# Patient Record
Sex: FEMALE | Race: WHITE
[De-identification: names, ages, dates, MRNs, and addresses within clinical notes are randomized per-mention and may not be internally consistent; named-entity substitution may affect disease eponyms.]

---

## 2018-05-07 ENCOUNTER — HOSPITAL ENCOUNTER (EMERGENCY)
Dept: HOSPITAL 8 - ED | Age: 28
Discharge: HOME | End: 2018-05-07
Payer: COMMERCIAL

## 2018-05-07 VITALS — SYSTOLIC BLOOD PRESSURE: 115 MMHG | DIASTOLIC BLOOD PRESSURE: 75 MMHG

## 2018-05-07 VITALS — HEIGHT: 71 IN | WEIGHT: 213.85 LBS | BODY MASS INDEX: 29.94 KG/M2

## 2018-05-07 DIAGNOSIS — O21.1: Primary | ICD-10-CM

## 2018-05-07 DIAGNOSIS — E86.0: ICD-10-CM

## 2018-05-07 DIAGNOSIS — O23.12: ICD-10-CM

## 2018-05-07 DIAGNOSIS — O99.282: ICD-10-CM

## 2018-05-07 DIAGNOSIS — Z3A.13: ICD-10-CM

## 2018-05-07 LAB
ALBUMIN SERPL-MCNC: 3 G/DL (ref 3.4–5)
ALP SERPL-CCNC: 63 U/L (ref 45–117)
ALT SERPL-CCNC: 21 U/L (ref 12–78)
ANION GAP SERPL CALC-SCNC: 8 MMOL/L (ref 5–15)
BASOPHILS # BLD AUTO: 0.02 X10^3/UL (ref 0–0.1)
BASOPHILS NFR BLD AUTO: 1 % (ref 0–1)
BILIRUB SERPL-MCNC: 1 MG/DL (ref 0.2–1)
CALCIUM SERPL-MCNC: 8.4 MG/DL (ref 8.5–10.1)
CHLORIDE SERPL-SCNC: 109 MMOL/L (ref 98–107)
CREAT SERPL-MCNC: 0.64 MG/DL (ref 0.55–1.02)
CULTURE INDICATED?: YES
EOSINOPHIL # BLD AUTO: 0.03 X10^3/UL (ref 0–0.4)
EOSINOPHIL NFR BLD AUTO: 1 % (ref 1–7)
ERYTHROCYTE [DISTWIDTH] IN BLOOD BY AUTOMATED COUNT: 13.7 % (ref 9.6–15.2)
LYMPHOCYTES # BLD AUTO: 0.43 X10^3/UL (ref 1–3.4)
LYMPHOCYTES NFR BLD AUTO: 11 % (ref 22–44)
MCH RBC QN AUTO: 28.7 PG (ref 27–34.8)
MCHC RBC AUTO-ENTMCNC: 33.8 G/DL (ref 32.4–35.8)
MCV RBC AUTO: 84.8 FL (ref 80–100)
MD: NO
MICROSCOPIC: (no result)
MONOCYTES # BLD AUTO: 0.36 X10^3/UL (ref 0.2–0.8)
MONOCYTES NFR BLD AUTO: 9 % (ref 2–9)
NEUTROPHILS # BLD AUTO: 3.21 X10^3/UL (ref 1.8–6.8)
NEUTROPHILS NFR BLD AUTO: 79 % (ref 42–75)
PLATELET # BLD AUTO: 194 X10^3/UL (ref 130–400)
PMV BLD AUTO: 9.2 FL (ref 7.4–10.4)
PROT SERPL-MCNC: 7.1 G/DL (ref 6.4–8.2)
RBC # BLD AUTO: 4.11 X10^6/UL (ref 3.82–5.3)

## 2018-05-07 PROCEDURE — S0028 INJECTION, FAMOTIDINE, 20 MG: HCPCS

## 2018-05-07 PROCEDURE — 96374 THER/PROPH/DIAG INJ IV PUSH: CPT

## 2018-05-07 PROCEDURE — 80053 COMPREHEN METABOLIC PANEL: CPT

## 2018-05-07 PROCEDURE — 87086 URINE CULTURE/COLONY COUNT: CPT

## 2018-05-07 PROCEDURE — 85025 COMPLETE CBC W/AUTO DIFF WBC: CPT

## 2018-05-07 PROCEDURE — 96375 TX/PRO/DX INJ NEW DRUG ADDON: CPT

## 2018-05-07 PROCEDURE — 36415 COLL VENOUS BLD VENIPUNCTURE: CPT

## 2018-05-07 PROCEDURE — 96361 HYDRATE IV INFUSION ADD-ON: CPT

## 2018-05-07 PROCEDURE — 81001 URINALYSIS AUTO W/SCOPE: CPT

## 2018-05-07 PROCEDURE — 99285 EMERGENCY DEPT VISIT HI MDM: CPT

## 2018-06-11 ENCOUNTER — HOSPITAL ENCOUNTER (EMERGENCY)
Dept: HOSPITAL 8 - ED | Age: 28
Discharge: HOME | End: 2018-06-11
Payer: COMMERCIAL

## 2018-06-11 VITALS — SYSTOLIC BLOOD PRESSURE: 106 MMHG | DIASTOLIC BLOOD PRESSURE: 64 MMHG

## 2018-06-11 VITALS — BODY MASS INDEX: 30.96 KG/M2 | WEIGHT: 221.12 LBS | HEIGHT: 71 IN

## 2018-06-11 DIAGNOSIS — O23.12: Primary | ICD-10-CM

## 2018-06-11 DIAGNOSIS — Z3A.18: ICD-10-CM

## 2018-06-11 LAB
BASOPHILS # BLD AUTO: 0.1 X10^3/UL (ref 0–0.1)
BASOPHILS NFR BLD AUTO: 1 % (ref 0–1)
CULTURE INDICATED?: YES
EOSINOPHIL # BLD AUTO: 0.06 X10^3/UL (ref 0–0.4)
EOSINOPHIL NFR BLD AUTO: 1 % (ref 1–7)
ERYTHROCYTE [DISTWIDTH] IN BLOOD BY AUTOMATED COUNT: 14.2 % (ref 9.6–15.2)
LYMPHOCYTES # BLD AUTO: 0.95 X10^3/UL (ref 1–3.4)
LYMPHOCYTES NFR BLD AUTO: 14 % (ref 22–44)
MCH RBC QN AUTO: 29.9 PG (ref 27–34.8)
MCHC RBC AUTO-ENTMCNC: 34.5 G/DL (ref 32.4–35.8)
MCV RBC AUTO: 86.6 FL (ref 80–100)
MD: NO
MICROSCOPIC: (no result)
MONOCYTES # BLD AUTO: 0.46 X10^3/UL (ref 0.2–0.8)
MONOCYTES NFR BLD AUTO: 7 % (ref 2–9)
NEUTROPHILS # BLD AUTO: 5.39 X10^3/UL (ref 1.8–6.8)
NEUTROPHILS NFR BLD AUTO: 78 % (ref 42–75)
PLATELET # BLD AUTO: 281 X10^3/UL (ref 130–400)
PMV BLD AUTO: 9.4 FL (ref 7.4–10.4)
RBC # BLD AUTO: 3.99 X10^6/UL (ref 3.82–5.3)

## 2018-06-11 PROCEDURE — 87086 URINE CULTURE/COLONY COUNT: CPT

## 2018-06-11 PROCEDURE — 36415 COLL VENOUS BLD VENIPUNCTURE: CPT

## 2018-06-11 PROCEDURE — 85025 COMPLETE CBC W/AUTO DIFF WBC: CPT

## 2018-06-11 PROCEDURE — 76815 OB US LIMITED FETUS(S): CPT

## 2018-06-11 PROCEDURE — 84702 CHORIONIC GONADOTROPIN TEST: CPT

## 2018-06-11 PROCEDURE — 81001 URINALYSIS AUTO W/SCOPE: CPT

## 2018-06-11 PROCEDURE — 99285 EMERGENCY DEPT VISIT HI MDM: CPT

## 2018-06-11 PROCEDURE — 86901 BLOOD TYPING SEROLOGIC RH(D): CPT

## 2018-07-20 ENCOUNTER — HOSPITAL ENCOUNTER (EMERGENCY)
Dept: HOSPITAL 8 - ED | Age: 28
Discharge: HOME | End: 2018-07-20
Payer: COMMERCIAL

## 2018-07-20 VITALS — HEIGHT: 71 IN | BODY MASS INDEX: 31.7 KG/M2 | WEIGHT: 226.41 LBS

## 2018-07-20 VITALS — DIASTOLIC BLOOD PRESSURE: 68 MMHG | SYSTOLIC BLOOD PRESSURE: 128 MMHG

## 2018-07-20 DIAGNOSIS — Z04.8: ICD-10-CM

## 2018-07-20 DIAGNOSIS — Z20.1: Primary | ICD-10-CM

## 2018-07-20 PROCEDURE — 99284 EMERGENCY DEPT VISIT MOD MDM: CPT

## 2018-07-20 PROCEDURE — 71046 X-RAY EXAM CHEST 2 VIEWS: CPT

## 2018-08-07 ENCOUNTER — HOSPITAL ENCOUNTER (OUTPATIENT)
Dept: HOSPITAL 8 - LDOP | Age: 28
Setting detail: OBSERVATION
LOS: 1 days | Discharge: HOME | End: 2018-08-08
Attending: OBSTETRICS & GYNECOLOGY | Admitting: OBSTETRICS & GYNECOLOGY
Payer: COMMERCIAL

## 2018-08-07 VITALS — WEIGHT: 230.38 LBS | HEIGHT: 70 IN | BODY MASS INDEX: 32.98 KG/M2

## 2018-08-07 DIAGNOSIS — O46.92: Primary | ICD-10-CM

## 2018-08-07 DIAGNOSIS — Z3A.26: ICD-10-CM

## 2018-08-07 PROCEDURE — G0378 HOSPITAL OBSERVATION PER HR: HCPCS

## 2018-08-07 PROCEDURE — 76815 OB US LIMITED FETUS(S): CPT

## 2018-08-08 ENCOUNTER — HOSPITAL ENCOUNTER (OUTPATIENT)
Dept: HOSPITAL 8 - LDOP | Age: 28
Discharge: HOME | End: 2018-08-08
Attending: OBSTETRICS & GYNECOLOGY
Payer: COMMERCIAL

## 2018-08-08 VITALS — DIASTOLIC BLOOD PRESSURE: 60 MMHG | SYSTOLIC BLOOD PRESSURE: 109 MMHG

## 2018-08-08 VITALS — BODY MASS INDEX: 32.98 KG/M2 | HEIGHT: 70 IN | WEIGHT: 230.38 LBS

## 2018-08-08 DIAGNOSIS — Z3A.26: ICD-10-CM

## 2018-08-08 DIAGNOSIS — R10.9: ICD-10-CM

## 2018-08-08 DIAGNOSIS — O26.892: Primary | ICD-10-CM

## 2018-08-08 PROCEDURE — 59025 FETAL NON-STRESS TEST: CPT

## 2018-09-04 ENCOUNTER — HOSPITAL ENCOUNTER (OUTPATIENT)
Dept: HOSPITAL 8 - LDOP | Age: 28
Discharge: HOME | End: 2018-09-04
Attending: OBSTETRICS & GYNECOLOGY
Payer: COMMERCIAL

## 2018-09-04 VITALS — BODY MASS INDEX: 32.96 KG/M2 | WEIGHT: 235.45 LBS | HEIGHT: 71 IN

## 2018-09-04 VITALS — DIASTOLIC BLOOD PRESSURE: 58 MMHG | SYSTOLIC BLOOD PRESSURE: 109 MMHG

## 2018-09-04 DIAGNOSIS — O36.8130: Primary | ICD-10-CM

## 2018-09-04 DIAGNOSIS — Z3A.30: ICD-10-CM

## 2018-09-04 PROCEDURE — G0463 HOSPITAL OUTPT CLINIC VISIT: HCPCS

## 2018-09-04 PROCEDURE — 99211 OFF/OP EST MAY X REQ PHY/QHP: CPT

## 2018-09-04 PROCEDURE — 59025 FETAL NON-STRESS TEST: CPT

## 2018-10-12 ENCOUNTER — HOSPITAL ENCOUNTER (OUTPATIENT)
Dept: HOSPITAL 8 - LDOP | Age: 28
Discharge: HOME | End: 2018-10-12
Attending: OBSTETRICS & GYNECOLOGY
Payer: COMMERCIAL

## 2018-10-12 VITALS — BODY MASS INDEX: 33.33 KG/M2 | HEIGHT: 71 IN | WEIGHT: 238.1 LBS

## 2018-10-12 DIAGNOSIS — R10.9: ICD-10-CM

## 2018-10-12 DIAGNOSIS — Z3A.36: ICD-10-CM

## 2018-10-12 DIAGNOSIS — O26.893: Primary | ICD-10-CM

## 2018-10-12 LAB — MICROSCOPIC: (no result)

## 2018-10-12 PROCEDURE — 99211 OFF/OP EST MAY X REQ PHY/QHP: CPT

## 2018-10-12 PROCEDURE — 81001 URINALYSIS AUTO W/SCOPE: CPT

## 2018-10-12 PROCEDURE — 59025 FETAL NON-STRESS TEST: CPT

## 2018-10-12 PROCEDURE — G0463 HOSPITAL OUTPT CLINIC VISIT: HCPCS

## 2018-10-12 PROCEDURE — 87086 URINE CULTURE/COLONY COUNT: CPT

## 2018-10-12 PROCEDURE — 89060 EXAM SYNOVIAL FLUID CRYSTALS: CPT

## 2018-10-15 ENCOUNTER — HOSPITAL ENCOUNTER (OUTPATIENT)
Dept: HOSPITAL 8 - LDOP | Age: 28
Discharge: HOME | End: 2018-10-15
Attending: OBSTETRICS & GYNECOLOGY
Payer: COMMERCIAL

## 2018-10-15 DIAGNOSIS — O99.613: Primary | ICD-10-CM

## 2018-10-15 DIAGNOSIS — K80.20: ICD-10-CM

## 2018-10-15 DIAGNOSIS — Z3A.36: ICD-10-CM

## 2018-10-15 LAB
ALBUMIN SERPL-MCNC: 2.4 G/DL (ref 3.4–5)
ALP SERPL-CCNC: 131 U/L (ref 45–117)
ALT SERPL-CCNC: 37 U/L (ref 12–78)
ANION GAP SERPL CALC-SCNC: 9 MMOL/L (ref 5–15)
BASOPHILS # BLD AUTO: 0.02 X10^3/UL (ref 0–0.1)
BASOPHILS NFR BLD AUTO: 0 % (ref 0–1)
BILIRUB SERPL-MCNC: 0.7 MG/DL (ref 0.2–1)
CALCIUM SERPL-MCNC: 8.8 MG/DL (ref 8.5–10.1)
CHLORIDE SERPL-SCNC: 108 MMOL/L (ref 98–107)
CREAT SERPL-MCNC: 0.78 MG/DL (ref 0.55–1.02)
EOSINOPHIL # BLD AUTO: 0.03 X10^3/UL (ref 0–0.4)
EOSINOPHIL NFR BLD AUTO: 0 % (ref 1–7)
ERYTHROCYTE [DISTWIDTH] IN BLOOD BY AUTOMATED COUNT: 14.1 % (ref 9.6–15.2)
LYMPHOCYTES # BLD AUTO: 1.09 X10^3/UL (ref 1–3.4)
LYMPHOCYTES NFR BLD AUTO: 12 % (ref 22–44)
MCH RBC QN AUTO: 29.2 PG (ref 27–34.8)
MCHC RBC AUTO-ENTMCNC: 33.9 G/DL (ref 32.4–35.8)
MCV RBC AUTO: 86 FL (ref 80–100)
MD: NO
MICROSCOPIC: (no result)
MONOCYTES # BLD AUTO: 0.84 X10^3/UL (ref 0.2–0.8)
MONOCYTES NFR BLD AUTO: 9 % (ref 2–9)
NEUTROPHILS # BLD AUTO: 7.51 X10^3/UL (ref 1.8–6.8)
NEUTROPHILS NFR BLD AUTO: 79 % (ref 42–75)
PLATELET # BLD AUTO: 251 X10^3/UL (ref 130–400)
PMV BLD AUTO: 9.9 FL (ref 7.4–10.4)
PROT SERPL-MCNC: 6.9 G/DL (ref 6.4–8.2)
RBC # BLD AUTO: 3.71 X10^6/UL (ref 3.82–5.3)

## 2018-10-15 PROCEDURE — G0463 HOSPITAL OUTPT CLINIC VISIT: HCPCS

## 2018-10-15 PROCEDURE — 81001 URINALYSIS AUTO W/SCOPE: CPT

## 2018-10-15 PROCEDURE — 36415 COLL VENOUS BLD VENIPUNCTURE: CPT

## 2018-10-15 PROCEDURE — 82150 ASSAY OF AMYLASE: CPT

## 2018-10-15 PROCEDURE — 85025 COMPLETE CBC W/AUTO DIFF WBC: CPT

## 2018-10-15 PROCEDURE — 80053 COMPREHEN METABOLIC PANEL: CPT

## 2018-10-15 PROCEDURE — 83690 ASSAY OF LIPASE: CPT

## 2018-10-15 PROCEDURE — 99211 OFF/OP EST MAY X REQ PHY/QHP: CPT

## 2018-10-15 PROCEDURE — 59025 FETAL NON-STRESS TEST: CPT

## 2018-10-15 PROCEDURE — 76705 ECHO EXAM OF ABDOMEN: CPT

## 2018-10-18 ENCOUNTER — HOSPITAL ENCOUNTER (OUTPATIENT)
Dept: HOSPITAL 8 - LDOP | Age: 28
Discharge: HOME | End: 2018-10-18
Attending: OBSTETRICS & GYNECOLOGY
Payer: COMMERCIAL

## 2018-10-18 VITALS — WEIGHT: 238.76 LBS | HEIGHT: 71 IN | BODY MASS INDEX: 33.43 KG/M2

## 2018-10-18 DIAGNOSIS — Z3A.37: ICD-10-CM

## 2018-10-18 DIAGNOSIS — O46.93: Primary | ICD-10-CM

## 2018-10-18 PROCEDURE — 99211 OFF/OP EST MAY X REQ PHY/QHP: CPT

## 2018-10-18 PROCEDURE — 59025 FETAL NON-STRESS TEST: CPT

## 2018-10-18 PROCEDURE — G0463 HOSPITAL OUTPT CLINIC VISIT: HCPCS

## 2020-01-29 NOTE — NUR
PT UPRIGHT ON GURNEY AWAKE & COMFORTABLE, RESPONDS APPROP TO STAFF, NAD, 
COMFORT MEASURES PROVIDED, CALL LIGHT WITHIN REACH.

## 2020-01-29 NOTE — NUR
BREAK NOTE:  PT. IS RESTING WITHOUT CONCERNS.  VSS.  SIDERAILS REMAIN UP X 2 
WITH THE CALL LIGHT IN PLACE.  PT. STATES MINIMAL VAGINAL BLEEDING AT THIS 
TIME.

## 2020-02-07 NOTE — NUR
FIRST CONTACT.  FAISAL HOWARD IS DISCHARGING THE PT. FOR THE PRIMARY CARE RN.  PT. WAS 
GIVEN INSTRUCTIONS WITH UNDERSTANDING VERBALIZED ALONG WITH WILLINGNESS TO 
COMPLY.  PT.'S VITALS ARE STABLE.  PT. WAS AMBULATORY TO THE DISCHARGE DESK.

## 2020-02-07 NOTE — NUR
PT STATES HAVING COUGH X1 WEEK, NOW WITH SPOTTING AND CRAMPING PT IS 5 WEEKS 
PREGNANT, SPOTTING BEGAN LAST NIGHT. PT HAD MISCARRAIGE LAST SEPTEMBER, 2019. 
PT WANTS TO MAKE SURE EVERYTHING IS OK WITH BABY

## 2021-03-22 NOTE — NUR
PATIENT TO ER FOR LLE NUMBNESS/TINGLING AND PAIN IN HER CALF WHILE AT REST 
"LIKE IM GOING TO GET A GARY HORSE". + PALPABLE PEDAL PULSE. TRACE NOTEABLE 
SWELLING TO LLE BUT NON-PITTING AT THIS TIME. ALSO, REPORTS CHEST PAIN AND SOME 
SOB WHEN AMBULATING DUE TO HER PAIN IN HER CHEST "BUT I THINK THATS JUST A 
SYMPTOM FROM THE VACCINE". 



PATIENT MONITORED ON CARDIAC MONITOR AS WELL AS O2 PROBE AND BP. WILL CONTINUE 
TO MONITOR. + CSM IN LLE AND STRENGTH EQUAL ON BLE. NO DISCOLORATION NOTED TO 
LLE.

## 2021-03-22 NOTE — NUR
DISCHARGE INSTRUCTIONS REVIEWED WITH PATIENT. NO FURTHER QUESTIONS. NO IV 
PLACED DURING ER VISIT. STEADY GAIT TO LOBBY. ALL PERSONAL BELONGINGS WITH 
PATIENT ON DEPARTURE